# Patient Record
Sex: FEMALE | Race: WHITE | HISPANIC OR LATINO | URBAN - METROPOLITAN AREA
[De-identification: names, ages, dates, MRNs, and addresses within clinical notes are randomized per-mention and may not be internally consistent; named-entity substitution may affect disease eponyms.]

---

## 2017-02-18 ENCOUNTER — OUTPATIENT (OUTPATIENT)
Dept: OUTPATIENT SERVICES | Facility: HOSPITAL | Age: 57
LOS: 1 days | End: 2017-02-18
Payer: COMMERCIAL

## 2017-02-18 ENCOUNTER — APPOINTMENT (OUTPATIENT)
Dept: MRI IMAGING | Facility: CLINIC | Age: 57
End: 2017-02-18

## 2017-02-18 DIAGNOSIS — Z00.8 ENCOUNTER FOR OTHER GENERAL EXAMINATION: ICD-10-CM

## 2017-02-18 PROCEDURE — 73721 MRI JNT OF LWR EXTRE W/O DYE: CPT

## 2018-02-12 ENCOUNTER — APPOINTMENT (OUTPATIENT)
Dept: ORTHOPEDIC SURGERY | Facility: CLINIC | Age: 58
End: 2018-02-12

## 2018-03-12 ENCOUNTER — APPOINTMENT (OUTPATIENT)
Dept: ORTHOPEDIC SURGERY | Facility: CLINIC | Age: 58
End: 2018-03-12
Payer: COMMERCIAL

## 2018-03-12 VITALS
HEART RATE: 89 BPM | DIASTOLIC BLOOD PRESSURE: 75 MMHG | BODY MASS INDEX: 33.13 KG/M2 | SYSTOLIC BLOOD PRESSURE: 121 MMHG | WEIGHT: 180 LBS | HEIGHT: 62 IN

## 2018-03-12 PROCEDURE — 99203 OFFICE O/P NEW LOW 30 MIN: CPT | Mod: 25

## 2018-03-12 PROCEDURE — 20550 NJX 1 TENDON SHEATH/LIGAMENT: CPT | Mod: LT

## 2018-11-27 ENCOUNTER — APPOINTMENT (OUTPATIENT)
Dept: ORTHOPEDIC SURGERY | Facility: CLINIC | Age: 58
End: 2018-11-27

## 2018-12-11 ENCOUNTER — APPOINTMENT (OUTPATIENT)
Dept: ORTHOPEDIC SURGERY | Facility: CLINIC | Age: 58
End: 2018-12-11
Payer: COMMERCIAL

## 2018-12-11 DIAGNOSIS — Z80.9 FAMILY HISTORY OF MALIGNANT NEOPLASM, UNSPECIFIED: ICD-10-CM

## 2018-12-11 DIAGNOSIS — Z87.39 PERSONAL HISTORY OF OTHER DISEASES OF THE MUSCULOSKELETAL SYSTEM AND CONNECTIVE TISSUE: ICD-10-CM

## 2018-12-11 DIAGNOSIS — M65.332 TRIGGER FINGER, LEFT MIDDLE FINGER: ICD-10-CM

## 2018-12-11 DIAGNOSIS — Z78.9 OTHER SPECIFIED HEALTH STATUS: ICD-10-CM

## 2018-12-11 DIAGNOSIS — F19.90 OTHER PSYCHOACTIVE SUBSTANCE USE, UNSPECIFIED, UNCOMPLICATED: ICD-10-CM

## 2018-12-11 DIAGNOSIS — Z87.42 PERSONAL HISTORY OF OTHER DISEASES OF THE FEMALE GENITAL TRACT: ICD-10-CM

## 2018-12-11 PROCEDURE — 20550 NJX 1 TENDON SHEATH/LIGAMENT: CPT | Mod: LT

## 2018-12-11 PROCEDURE — 99214 OFFICE O/P EST MOD 30 MIN: CPT | Mod: 25

## 2019-06-16 ENCOUNTER — APPOINTMENT (OUTPATIENT)
Dept: ORTHOPEDIC SURGERY | Facility: CLINIC | Age: 59
End: 2019-06-16
Payer: COMMERCIAL

## 2019-06-16 VITALS — SYSTOLIC BLOOD PRESSURE: 122 MMHG | HEART RATE: 67 BPM | DIASTOLIC BLOOD PRESSURE: 64 MMHG

## 2019-06-16 VITALS — WEIGHT: 190 LBS | BODY MASS INDEX: 34.96 KG/M2 | HEIGHT: 62 IN

## 2019-06-16 DIAGNOSIS — G89.29 PAIN IN RIGHT KNEE: ICD-10-CM

## 2019-06-16 DIAGNOSIS — M25.561 PAIN IN RIGHT KNEE: ICD-10-CM

## 2019-06-16 PROCEDURE — 73564 X-RAY EXAM KNEE 4 OR MORE: CPT | Mod: RT

## 2019-06-16 PROCEDURE — 99213 OFFICE O/P EST LOW 20 MIN: CPT

## 2019-06-16 NOTE — PATIENT INSTRUCTIONS
[FreeTextEntry1] : Ice affected area for 20 minutes several times daily as needed, with at least 20 minutes of ice removal in between icing sessions \par Use OTC NSAIDs as instructed. Take with food. \par Physical Therapy\par

## 2019-06-16 NOTE — DISCUSSION/SUMMARY
[de-identified] : X-rays were reviewed with patient. Discussed the role of avoidance of irritating activities, ice, NSAIDs, visco supplementation, intra-articular steroid injections, physical therapy, home exercise program, possible need for eventual total knee replacement.

## 2019-06-16 NOTE — HISTORY OF PRESENT ILLNESS
[de-identified] : This patient presents with h/o right knee pain on and off for years. Most of the pain is located at medial aspect. Pain is described as sore and aching. There has not been any recent trauma. Patient admits to stiffness in the knee. Patient reports difficulty weight-bearing when getting up from a seated position.  There is pain when ambulating and stairs are difficult.  Patient denies swelling.  There is aching sometimes at night. There are no catching, locking or giving way episodes.  Patient has not had any treatment for this in the past, but has seen Dr. Ino Aviles for knee pain in the past.  She just didn't do anything for it. Patient has not tried ice/heat. Once in a while she will take an Advil, but typically does not take anything

## 2019-06-16 NOTE — PHYSICAL EXAM
[Slightly Antalgic] : slightly antalgic [Normal RLE] : Right Lower Extremity: No scars, rashes, lesions, ulcers, skin intact [Normal LLE] : Left Lower Extremity: No scars, rashes, lesions, ulcers, skin intact [Obese] : obese [de-identified] : no swelling, no edema, skin warm and well-perfused  [de-identified] : RightKnee:\par No varus/valgus deformity\par + circumferential enlargement\par no erythema, ecchymosis\par Trace effusion\par + Medial JLT       \par No Lateral JLT\par + significant pes anserine tenderness\par mild PF crepitus with ROM\par knee extension = -1,  knee flexion = 120\par 5/5 quads and adductors\par no laxity with varus/valgus stress\par Negative  Lachman & Anterior Drawer\par calf supple and NT\par \par Left knee exam limited as patient just had an injury at work that has not been evaluated\par + circumferential enlargement, no valgus or varus deformity\par 5/5 quads and adductors\par calf supple and NT [Normal] : no peripheral adenopathy appreciated [de-identified] : Normal rate, no increased respiratory effort, no use of accessory muscles, no nasal flaring  [de-identified] : central obesity [de-identified] : X-rays of the right knee reveal medial compartment narrowing, osteophyte formation at the femorotibial joint and patellofemoral joint.

## 2019-06-19 ENCOUNTER — APPOINTMENT (OUTPATIENT)
Dept: ORTHOPEDIC SURGERY | Facility: CLINIC | Age: 59
End: 2019-06-19
Payer: OTHER MISCELLANEOUS

## 2019-06-19 VITALS — BODY MASS INDEX: 34.96 KG/M2 | WEIGHT: 190 LBS | HEIGHT: 62 IN

## 2019-06-19 DIAGNOSIS — S80.02XA CONTUSION OF LEFT KNEE, INITIAL ENCOUNTER: ICD-10-CM

## 2019-06-19 PROCEDURE — 73564 X-RAY EXAM KNEE 4 OR MORE: CPT | Mod: LT

## 2019-06-19 PROCEDURE — 99214 OFFICE O/P EST MOD 30 MIN: CPT

## 2019-08-14 ENCOUNTER — APPOINTMENT (OUTPATIENT)
Dept: ORTHOPEDIC SURGERY | Facility: CLINIC | Age: 59
End: 2019-08-14
Payer: OTHER MISCELLANEOUS

## 2019-08-14 VITALS — HEIGHT: 62 IN | WEIGHT: 190 LBS | BODY MASS INDEX: 34.96 KG/M2

## 2019-08-14 PROCEDURE — 99214 OFFICE O/P EST MOD 30 MIN: CPT

## 2019-08-14 RX ORDER — HYALURONATE SODIUM, STABILIZED 88 MG/4 ML
88 SYRINGE (ML) INTRAARTICULAR
Qty: 1 | Refills: 0 | Status: ACTIVE | COMMUNITY
Start: 2019-08-14

## 2020-04-22 ENCOUNTER — APPOINTMENT (OUTPATIENT)
Dept: ORTHOPEDIC SURGERY | Facility: CLINIC | Age: 60
End: 2020-04-22
Payer: COMMERCIAL

## 2020-04-22 VITALS — TEMPERATURE: 97.5 F

## 2020-04-22 DIAGNOSIS — S93.409A SPRAIN OF UNSPECIFIED LIGAMENT OF UNSPECIFIED ANKLE, INITIAL ENCOUNTER: ICD-10-CM

## 2020-04-22 PROCEDURE — 99214 OFFICE O/P EST MOD 30 MIN: CPT

## 2020-04-22 PROCEDURE — 73610 X-RAY EXAM OF ANKLE: CPT | Mod: LT

## 2020-04-22 NOTE — DISCUSSION/SUMMARY
[de-identified] : I discussed the underlying pathophysiology of the patient's condition in great detail with the patient. I went over the patient's x-rays with them in great detail. We discussed the use of ice, Tylenol and anti-inflammatories to relieve pain. The patient was instructed in ROM exercises they are to do at home. At-home strengthening, and stretching exercises were discussed and demonstrated with the patient. We went over the wide ranging benefits of exercise for the patient health and I encouraged her to begin a diligent exercise program. I stressed the importance of continued weight loss and its benefits to the patient’s joints and overall health.  She needs to avoid high-impact activities such as running and jumping. I recommend alternative activities such as riding a stationary bike on low tension, or the elliptical. She should focus on light weight and high repetition exercises. She  should avoid squatting, and kneeling. Regarding her left ankle she should do ROM and strength exercises in a basin filled with cold water. Proper cane walking protocol were discussed and demonstrated with the patient. She should use a cane in the right hand to unload her left ankle. I recommended that she uses a Thera-band to practice left ankle ROM and strength exercises, and supplied her with a blue one. We discussed in detail that risk factors such as Diabetes, Hypertension, and Obesity are major risk factors for day COVID-19 and having serious consequences as a result. Because she has some of these risk factors I highly encouraged the patient to lose weight as fast and as healthy as possible. All of her questions were answered. She understands and consents to the plan. \par \par FU 1 month \par after completing a diligent HEP

## 2020-04-22 NOTE — ADDENDUM
[FreeTextEntry1] : I, Tavo Suarez , acted solely as a scribe for Dr. Ino Aviles on this date 04/22/2020.\par All medical record entries made by the Scribe were at my, Dr. Ino Aviles, direction and personally dictated by me on 04/22/2020. I have reviewed the chart and agree that the record accurately reflects my personal performance of the history, physical exam, assessment and plan. I have also personally directed, reviewed, and agreed with the chart.

## 2020-04-22 NOTE — HISTORY OF PRESENT ILLNESS
[de-identified] : 60 year old patient presents complaining of left ankle pain and swelling that started as a result of a fall on 04/08/2020. She was leaving someone's house and slipped on a pile of leaves. She landed on her backside and is unsure whether her ankle twisted or not. She notes that her ankle has been swollen, painful and stiff since this happened. She has not injured her ankle in the past. Walking and ROM have become difficult. She has been icing, which has helped minimally. She does note that the swelling has improved a bit but the pain and stiffness have not changed very much. She does not have pain that wakes her at night but when she gets up after resting, her pain and stiffness are worse. She denies numbness and tingling. She takes Otesla for psoriatic arthritis

## 2020-04-22 NOTE — PHYSICAL EXAM
[de-identified] : Constitutional\par o Appearance : well-nourished, well developed, alert, in no acute distress \par Head and Face\par o Head :\par ¦ Inspection : atraumatic, normocephalic\par o Face :\par ¦ Inspection : no visible rash or discoloration\par Lymphatic\par o Epitrochlear Nodes : no lymphadenopathy \par o Popliteal Nodes : no palpable nodes present \par o Supraclavicular Nodes : no supraclavicular nodes present \par o Preauricular Nodes : no preauricular nodes present \par o Additional Nodes : no additional adenopathy present Skin and Subcutaneous Tissue \par o Head and Neck :\par ¦ Face : no facial lesions\par Neurologic\par o Mental Status Examination :\par ¦ Orientation : alert and oriented X 3\par o Coordination : finger-to-nose-to-finger testing normal bilaterally, heel-shin cerebellar test within normal limits bilaterally \par Psychiatric\par o Mood and Affect: mood normal, affect appropriate \par \par Right Lower Extremity\par o Right Ankle :\par ¦ Inspection/Palpation : no tenderness to palpation, no swelling    \par ¦ Range of Motion : arc of motion full and painless in all planes\par ¦ Stability : no joint instability en provocative testing\par ¦ Strength : all muscles 5/5\par o Skin : no erythema or ecchymosis present\par o Sensation : sensation to pin intact\par Tests and Signs : De La Rosa's test negative\par \par o Right Foot:\par ¦ Inspection/Palpation : no tenderness to palpation, no swelling\par ¦ Range of Motion : arc of motion full and painless in all planes\par ¦ Stability : no joint instability on provocative testing\par ¦ Strength : all muscles 5/5\par o Skin : no erythema or ecchymosis present\par \par Left Lower Extremity \par o Left Ankle :\par ¦ Inspection/Palpation : ATFL tenderness to palpation, tenderness over the CFL, no deltoid ligament tenderness, no proximal fibular tenderness,  moderate swelling, resolving ecchymosis on the medial and lateral aspect of the ankle, Achillis intact. \par ¦ Range of Motion : pain with dorsi and volar flexion, pain with inversion. \par ¦ Stability : no joint instability en provocative testing\par ¦ Strength : all muscles 5/5\par o Skin : no erythema or ecchymosis present\par o Sensation : sensation to pin intact\par Tests and Signs : De La Rosa's test negative\par \par o Left Foot:\par ¦ Inspection/Palpation : no tenderness to palpation, moderate swelling\par ¦ Range of Motion : arc of motion full and painless in all planes\par ¦ Stability : no joint instability on provocative testing\par ¦ Strength : all muscles 5/5\par o Skin : no erythema or ecchymosis present\par \par Dorsalis Pedis / Posterior Tibialis Pulse Bilaterally: 2+\par \par Gait and Station:\par Gait: short cautious gait, pain with weight bearing,  no significant extremity swelling or lymphedema, good proprioception and balance\par \par  Radiology Results \par o Left Ankle : AP, lateral and mortise views were obtained and reveal mid foot arthritis, mild degenerative arthritis of the ankle, no obvious fracture.

## 2020-05-27 ENCOUNTER — APPOINTMENT (OUTPATIENT)
Dept: ORTHOPEDIC SURGERY | Facility: CLINIC | Age: 60
End: 2020-05-27
Payer: COMMERCIAL

## 2020-05-27 VITALS — BODY MASS INDEX: 34.96 KG/M2 | WEIGHT: 190 LBS | HEIGHT: 62 IN

## 2020-05-27 PROCEDURE — 99214 OFFICE O/P EST MOD 30 MIN: CPT | Mod: 25

## 2020-05-27 PROCEDURE — 20605 DRAIN/INJ JOINT/BURSA W/O US: CPT | Mod: LT

## 2020-06-29 ENCOUNTER — APPOINTMENT (OUTPATIENT)
Dept: ORTHOPEDIC SURGERY | Facility: CLINIC | Age: 60
End: 2020-06-29

## 2020-07-01 ENCOUNTER — APPOINTMENT (OUTPATIENT)
Dept: ORTHOPEDIC SURGERY | Facility: CLINIC | Age: 60
End: 2020-07-01
Payer: COMMERCIAL

## 2020-07-01 DIAGNOSIS — M19.079 PRIMARY OSTEOARTHRITIS, UNSPECIFIED ANKLE AND FOOT: ICD-10-CM

## 2020-07-01 PROCEDURE — 99214 OFFICE O/P EST MOD 30 MIN: CPT

## 2020-07-01 PROCEDURE — 73562 X-RAY EXAM OF KNEE 3: CPT | Mod: LT

## 2020-07-01 RX ORDER — HYALURONATE SODIUM, STABILIZED 88 MG/4 ML
88 SYRINGE (ML) INTRAARTICULAR
Qty: 1 | Refills: 0 | Status: ACTIVE | COMMUNITY
Start: 2020-07-01

## 2020-07-05 ENCOUNTER — FORM ENCOUNTER (OUTPATIENT)
Age: 60
End: 2020-07-05

## 2020-07-09 ENCOUNTER — APPOINTMENT (OUTPATIENT)
Dept: ORTHOPEDIC SURGERY | Facility: CLINIC | Age: 60
End: 2020-07-09
Payer: OTHER MISCELLANEOUS

## 2020-07-09 DIAGNOSIS — S80.02XD CONTUSION OF LEFT KNEE, SUBSEQUENT ENCOUNTER: ICD-10-CM

## 2020-07-09 PROCEDURE — 99214 OFFICE O/P EST MOD 30 MIN: CPT

## 2020-07-09 RX ORDER — HYALURONATE SODIUM, STABILIZED 88 MG/4 ML
88 SYRINGE (ML) INTRAARTICULAR
Qty: 1 | Refills: 0 | Status: ACTIVE | COMMUNITY
Start: 2020-07-09

## 2020-11-18 ENCOUNTER — APPOINTMENT (OUTPATIENT)
Dept: ORTHOPEDIC SURGERY | Facility: CLINIC | Age: 60
End: 2020-11-18
Payer: OTHER MISCELLANEOUS

## 2020-11-18 PROCEDURE — 99214 OFFICE O/P EST MOD 30 MIN: CPT | Mod: 25

## 2020-11-18 PROCEDURE — 20611 DRAIN/INJ JOINT/BURSA W/US: CPT | Mod: RT

## 2020-12-23 ENCOUNTER — APPOINTMENT (OUTPATIENT)
Dept: ORTHOPEDIC SURGERY | Facility: CLINIC | Age: 60
End: 2020-12-23
Payer: OTHER MISCELLANEOUS

## 2020-12-23 PROCEDURE — 99213 OFFICE O/P EST LOW 20 MIN: CPT

## 2020-12-23 PROCEDURE — 99072 ADDL SUPL MATRL&STAF TM PHE: CPT

## 2021-03-24 ENCOUNTER — APPOINTMENT (OUTPATIENT)
Dept: ORTHOPEDIC SURGERY | Facility: CLINIC | Age: 61
End: 2021-03-24

## 2021-04-14 ENCOUNTER — APPOINTMENT (OUTPATIENT)
Dept: ORTHOPEDIC SURGERY | Facility: CLINIC | Age: 61
End: 2021-04-14

## 2021-07-21 ENCOUNTER — APPOINTMENT (OUTPATIENT)
Dept: ORTHOPEDIC SURGERY | Facility: CLINIC | Age: 61
End: 2021-07-21
Payer: OTHER MISCELLANEOUS

## 2021-07-21 VITALS — HEIGHT: 62 IN | BODY MASS INDEX: 34.96 KG/M2 | WEIGHT: 190 LBS

## 2021-07-21 PROCEDURE — 73562 X-RAY EXAM OF KNEE 3: CPT | Mod: LT

## 2021-07-21 PROCEDURE — 99213 OFFICE O/P EST LOW 20 MIN: CPT

## 2021-07-21 PROCEDURE — 99072 ADDL SUPL MATRL&STAF TM PHE: CPT

## 2021-07-21 RX ORDER — HYALURONATE SODIUM, STABILIZED 88 MG/4 ML
88 SYRINGE (ML) INTRAARTICULAR
Qty: 1 | Refills: 0 | Status: ACTIVE | COMMUNITY
Start: 2021-07-21

## 2021-07-21 NOTE — HISTORY OF PRESENT ILLNESS
[de-identified] : Worker's Compensation Visit:\par \par 61 year old female presents complaining of left lateral knee pain. She received a left knee Monovisc injection on 11/18/2020 and did well with it. Her relief has been wearing off recently. She is complaining of discomfort at night time. She notes difficulty bending her knee and standing up from a seated position. She notes occasional buckling that has caused her to fall twice. She avoids motions involving bending and kneeling to avoid aggravating her pain. She has responded well to viscosupplementation in the past. The patient denies taking any medication to manage her pain. She understands that weight loss is important and is trying to lose weight. She is currently working. Pmhx: She has a hx of psoriatic arthritis and takes Otezla.\par \par Radiology Results taken on 7/1/2020:\par o Left Knee: Standing AP, lateral and tunnel views were obtained and revealed moderate to severe medial and patellofemoral arthritis.

## 2021-07-21 NOTE — DISCUSSION/SUMMARY
[de-identified] : I discussed the underlying pathophysiology of the patient's condition in great detail with the patient. I went over the patient's x-rays with them in great detail. We discussed the use of ice, Tylenol and anti-inflammatories to relieve pain. I stressed the importance of weight loss and its benefits to the patient’s joints and overall health. Viscosupplementation was discussed as a solution to the patient's symptoms, and we are requesting from Worker's Compensation authorization for Monovisc for the left knee. All of her questions were answered. She understands and consents to the plan.\par \par FU 1 month.\par after left knee viscosupplementation authorization.\par after focusing on weight loss.

## 2021-07-21 NOTE — REASON FOR VISIT
[Follow-Up Visit] : a follow-up visit for [Workers' Comp: Date of Injury: _______] : This visit is related to worker's compensation. Date of Injury: [unfilled] [Knee Pain] : knee pain

## 2021-07-21 NOTE — ADDENDUM
[FreeTextEntry1] : I, Yahir Gurrola, acted solely as a scribe for Dr. Ino Aviles on this date 07/21/2021.\par All medical record entries made by the Scribe were at my, Dr. Ino Aviles, direction and personally dictated by me on 07/21/2021. I have reviewed the chart and agree that the record accurately reflects my personal performance of the history, physical exam, assessment and plan. I have also personally directed, reviewed, and agreed with the chart.

## 2022-02-14 ENCOUNTER — APPOINTMENT (OUTPATIENT)
Dept: ORTHOPEDIC SURGERY | Facility: CLINIC | Age: 62
End: 2022-02-14
Payer: COMMERCIAL

## 2022-02-14 DIAGNOSIS — M65.341 TRIGGER FINGER, RIGHT RING FINGER: ICD-10-CM

## 2022-02-14 PROCEDURE — 20550 NJX 1 TENDON SHEATH/LIGAMENT: CPT | Mod: F8

## 2022-02-14 PROCEDURE — 99214 OFFICE O/P EST MOD 30 MIN: CPT | Mod: 25

## 2022-06-10 NOTE — PHYSICAL EXAM
[de-identified] : Constitutional\par o Appearance : well-nourished, well developed, alert, in no acute distress \par Head and Face\par o Head :\par ¦ Inspection : atraumatic, normocephalic\par o Face :\par ¦ Inspection : no visible rash or discoloration\par Lymphatic\par o Epitrochlear Nodes : no lymphadenopathy \par o Popliteal Nodes : no palpable nodes present \par o Supraclavicular Nodes : no supraclavicular nodes present \par o Preauricular Nodes : no preauricular nodes present \par o Additional Nodes : no additional adenopathy present Skin and Subcutaneous Tissue \par o Head and Neck :\par ¦ Face : no facial lesions\par Neurologic\par o Mental Status Examination :\par ¦ Orientation : alert and oriented X 3\par o Coordination : finger-to-nose-to-finger testing normal bilaterally, heel-shin cerebellar test within normal limits bilaterally \par Psychiatric\par o Mood and Affect: mood normal, affect appropriate \par \par Right Lower Extremity\par o Buttock : no tenderness, swelling or deformities \par o Right Hip :\par ¦ Inspection/Palpation : no tenderness, no swelling or deformities\par ¦ Range of Motion : full and painless in all planes, no crepitance\par ¦ Stability : joint stability intact\par ¦ Strength : extension, flexion, adduction, abduction, internal rotation and external rotation 4+/5\par \par o Right Knee :\par ¦ Inspection/Palpation : no tenderness to palpation, no swelling\par ¦ Range of Motion : active flexion and extension full and painless, no crepitance\par ¦ Stability : no valgus or varus instability present on provocative testing\par ¦ Strength : flexion and extension 4+/5\par ¦ Tests and Signs : Anterior Drawer negative, Lachman negative, Kathy's negative\par \par Left Lower Extremity \par o Buttock : no tenderness, swelling or deformities \par o Left Hip :\par ¦ Inspection/Palpation : no tenderness, no swelling or deformities\par ¦ Range of Motion : full and painless in all planes, no crepitance\par ¦ Stability : joint stability intact\par ¦ Strength : extension, flexion, adduction, abduction, internal rotation and external rotation 4+/5\par \par o Left Knee :\par ¦ Inspection/Palpation : lateral and mild medial compartment tenderness, mild swelling, valgus deformity\par ¦ Range of Motion : 0-130° with no discomfort on full flexion, patellofemoral crepitance, decreased patellofemoral glide\par ¦ Stability : no valgus or varus instability present on provocative testing\par ¦ Strength : flexion and extension 4+/5\par ¦ Tests and Signs : Anterior Drawer negative, Lachman negative, Kathy's negative\par \par Dorsalis Pedis / Posterior Tibialis Pulse Bilaterally: 2+\par \par Gait and Station:\par Gait: short cautious gait, no significant extremity swelling or lymphedema, good proprioception and balance\par \par Radiology Results \par o Left Knee : Standing AP, lateral and tunnel views of the knee were obtained and revealed moderate tricompartmental osteoarthritis worse in the medial and patellofemoral compartments. Valtrex Counseling: I discussed with the patient the risks of valacyclovir including but not limited to kidney damage, nausea, vomiting and severe allergy.  The patient understands that if the infection seems to be worsening or is not improving, they are to call.

## 2023-03-01 ENCOUNTER — APPOINTMENT (OUTPATIENT)
Dept: ORTHOPEDIC SURGERY | Facility: CLINIC | Age: 63
End: 2023-03-01
Payer: OTHER MISCELLANEOUS

## 2023-03-01 PROCEDURE — 99214 OFFICE O/P EST MOD 30 MIN: CPT | Mod: 25

## 2023-03-01 PROCEDURE — 73564 X-RAY EXAM KNEE 4 OR MORE: CPT | Mod: LT

## 2023-03-01 PROCEDURE — 20610 DRAIN/INJ JOINT/BURSA W/O US: CPT | Mod: LT

## 2023-03-01 PROCEDURE — 99072 ADDL SUPL MATRL&STAF TM PHE: CPT

## 2023-03-01 RX ORDER — HYALURONATE SODIUM, STABILIZED 88 MG/4 ML
88 SYRINGE (ML) INTRAARTICULAR
Qty: 1 | Refills: 0 | Status: ACTIVE | COMMUNITY
Start: 2023-03-01

## 2023-03-20 RX ORDER — HYALURONATE SODIUM, STABILIZED 88 MG/4 ML
88 SYRINGE (ML) INTRAARTICULAR
Qty: 1 | Refills: 0 | Status: ACTIVE | COMMUNITY
Start: 2023-03-20

## 2023-03-20 RX ORDER — HYALURONATE SODIUM, STABILIZED 88 MG/4 ML
88 SYRINGE (ML) INTRAARTICULAR
Qty: 1 | Refills: 0 | Status: ACTIVE | COMMUNITY
Start: 2023-03-20 | End: 1900-01-01

## 2023-04-10 ENCOUNTER — APPOINTMENT (OUTPATIENT)
Dept: ORTHOPEDIC SURGERY | Facility: CLINIC | Age: 63
End: 2023-04-10
Payer: OTHER MISCELLANEOUS

## 2023-04-10 DIAGNOSIS — M17.11 UNILATERAL PRIMARY OSTEOARTHRITIS, RIGHT KNEE: ICD-10-CM

## 2023-04-10 PROCEDURE — 20611 DRAIN/INJ JOINT/BURSA W/US: CPT | Mod: LT

## 2023-10-26 ENCOUNTER — APPOINTMENT (OUTPATIENT)
Dept: ORTHOPEDIC SURGERY | Facility: CLINIC | Age: 63
End: 2023-10-26
Payer: OTHER MISCELLANEOUS

## 2023-10-26 PROCEDURE — 99214 OFFICE O/P EST MOD 30 MIN: CPT

## 2023-10-26 RX ORDER — HYALURONATE SODIUM, STABILIZED 88 MG/4 ML
88 SYRINGE (ML) INTRAARTICULAR
Qty: 1 | Refills: 0 | Status: ACTIVE | COMMUNITY
Start: 2023-10-26

## 2023-11-27 RX ORDER — HYALURONATE SODIUM, STABILIZED 88 MG/4 ML
88 SYRINGE (ML) INTRAARTICULAR
Qty: 1 | Refills: 0 | Status: ACTIVE | COMMUNITY
Start: 2023-11-27 | End: 1900-01-01

## 2023-12-13 ENCOUNTER — APPOINTMENT (OUTPATIENT)
Dept: ORTHOPEDIC SURGERY | Facility: CLINIC | Age: 63
End: 2023-12-13
Payer: OTHER MISCELLANEOUS

## 2023-12-13 VITALS — WEIGHT: 195 LBS | BODY MASS INDEX: 35.88 KG/M2 | HEIGHT: 62 IN

## 2023-12-13 PROCEDURE — 20611 DRAIN/INJ JOINT/BURSA W/US: CPT | Mod: LT

## 2023-12-13 NOTE — PHYSICAL EXAM
[de-identified] : Constitutional o Appearance : well-nourished, well developed, alert, in no acute distress  Head and Face o Head :  Inspection : atraumatic, normocephalic o Face :  Inspection : no visible rash or discoloration Lymphatic o Epitrochlear Nodes : no lymphadenopathy  o Popliteal Nodes : no palpable nodes present  o Supraclavicular Nodes : no supraclavicular nodes present  o Preauricular Nodes : no preauricular nodes present  o Additional Nodes : no additional adenopathy present Skin and Subcutaneous Tissue  o Head and Neck :  Face : no facial lesions Neurologic o Mental Status Examination :  Orientation : alert and oriented X 3 o Coordination : finger-to-nose-to-finger testing normal bilaterally, heel-shin cerebellar test within normal limits bilaterally  Psychiatric o Mood and Affect: mood normal, affect appropriate   Right Lower Extremity o Buttock : no tenderness, swelling or deformities  o Right Hip :  Inspection/Palpation : no tenderness, no swelling or deformities  Range of Motion : full and painless in all planes, no crepitance  Stability : joint stability intact  Strength : extension, flexion, adduction, abduction, internal rotation and external rotation 4+/5  o Right Knee :  Inspection/Palpation : no tenderness to palpation, no swelling  Range of Motion : active flexion and extension full and painless, no crepitance  Stability : no valgus or varus instability present on provocative testing  Strength : flexion and extension 5/5  Tests and Signs : Anterior Drawer negative, Lachman negative, Kathy's negative  Left Lower Extremity  o Buttock : no tenderness, swelling or deformities  o Left Hip :  Inspection/Palpation : no tenderness, no swelling or deformities  Range of Motion : full and painless in all planes, no crepitance  Stability : joint stability intact  Strength : extension, flexion, adduction, abduction, internal rotation and external rotation 5/5  o Left Knee :  Inspection/Palpation : skin is intact, posterior medial joint line tenderness significant lateral compartment tenderness, no swelling, valgus deformity  Range of Motion : 0-118  with pain at the extremes of motion, patellofemoral crepitance, decreased patellofemoral glide  Stability : no valgus or varus instability present on provocative testing  Strength : flexion and extension 5/5  Tests and Signs : Anterior Drawer negative, Lachman negative, Kathy's negative  Dorsalis Pedis / Posterior Tibialis Pulse Bilaterally: 2+  Gait and Station: Gait: short cautious gait, no significant extremity swelling or lymphedema, good proprioception and balance  o Knee injection : Indication- left knee osteoarthritis, Anatomic location- left intra-articular joint space, Spray - area was sterilized with Betadine and alcohol and anesthetized with Ethyl Chloride , needle used-20G, Medications given- 4cc's Monovisc under Ultrasound guidance. The patient tolerated the procedure well. Lot# [id number] Exp#[id number]

## 2023-12-13 NOTE — ADDENDUM
[FreeTextEntry1] : I, NATAN FERNÁNDEZ, acted solely as a scribe for Dr. Ino Aviles on this date 12/13/2023.  All medical record entries made by the Scribe were at my, Dr. Ino Aviles, direction and personally dictated by me on 12/13/2023. I have reviewed the chart and agree that the record accurately reflects my personal performance of the history, physical exam, assessment and plan. I have also personally directed, reviewed, and agreed with the chart.

## 2023-12-13 NOTE — HISTORY OF PRESENT ILLNESS
[Yes] : The patient is currently working. [de-identified] : Worker's Compensation Visit:  63-year-old female presents for a left knee Monovisc injection today 12/13/2023. She denies any swelling or buckling. She had a left knee Monovisc injection on (04/10/2023) and reports improvement.  She received a cortisone injection on 03/01/23. She has left knee pain secondary to a WC injury. She has a history of psoriatic arthritis.  She has history of psoriasis and that is being treated by a Dermatologist.   Radiology Results from 03/01/2023: o Left Knee : Standing AP, lateral and tunnel views of the knee were obtained and revealed significant lateral and patellofemoral arthritis.

## 2023-12-13 NOTE — DISCUSSION/SUMMARY
[de-identified] : I went over the pathophysiology of the patient's symptoms in great detail with the patient. The patient elected to receive a Monovisc injection into her left knee today, and tolerated it well. I instructed the patient on ROM exercises, and told them to take it easy. The use of ice and rest was reviewed with the patient. The patient may resume activities tomorrow. I reminded the patient that it takes 4 to 6 weeks after the final injection to feel symptom relief.   All of their questions were answered. They understand and consent to the plan.   FU in 6 weeks.   The patient is an 63 year old female with bone on bone arthritis of their left knee. Based upon the patient's continued symptoms and failure to respond to conservative treatment I have recommended a total knee replacement for this patient. A long discussion took place with the patient describing what a total joint replacement is and what the procedure would entail. A total knee model, similar to the implant that will be used during the operation was utilized to demonstrate and to discuss the various bearing surfaces of the implants. The hospitalization and post-operative care and rehabilitation were also discussed. The use of perioperative antibiotics and DVT prophylaxis were discussed. The risk, benefits and alternatives to a surgical intervention were discussed at length with the patient. The patient was also advised of risks related to the medical comorbidities and elevated body mass index (BMI). A lengthy discussion took place to review the most common complications including but not limited to: deep vein thrombosis, pulmonary embolus, heart attack, stroke, infection, wound breakdown, numbness, damage to nerves, tendon, muscles, arteries or other blood vessels, death and other possible complications from anesthesia. The patient was told that we will take steps to minimize these risks by using sterile technique, antibiotics and DVT prophylaxis when appropriate and follow the patient postoperatively in the office setting to monitor progress. The possibility of recurrent pain, no improvement in pain and actual worsening of pain were also discussed with the patient.  The discharge plan of care focused on the patient going home following surgery. I encouraged the patient to make the necessary arrangements to have someone stay with them when they are discharged home. Following discharge, a home care nurse will visit the patient. They will open your home care case and request home physical therapy services. Home physical therapy will commence following discharge provided it is appropriate and covered by her health insurance benefit plan.  The risks, benefits and alternative treatment options of total joint replacement were reviewed with the patient at great length. All questions were answered to the satisfaction of the patient. The patient participated in an interactive discussion of the total knee replacement implant planned for their surgery with questions answered. The patient agreed with the treatment plan, and has decided to move forward with the elective total knee replacement as planned.  BLANK BOSS was seen face to face and needs a commode for bedside use as the patient has no ability to acces the bathroom in their home. The patient also requires a rolling walker as this is needed for activities of daily living within their home secondary to the diagnosis of osteoarthritis.

## 2024-02-12 ENCOUNTER — APPOINTMENT (OUTPATIENT)
Dept: ORTHOPEDIC SURGERY | Facility: CLINIC | Age: 64
End: 2024-02-12

## 2024-05-06 ENCOUNTER — APPOINTMENT (OUTPATIENT)
Dept: ORTHOPEDIC SURGERY | Facility: CLINIC | Age: 64
End: 2024-05-06
Payer: OTHER MISCELLANEOUS

## 2024-05-06 VITALS — BODY MASS INDEX: 35.88 KG/M2 | HEIGHT: 62 IN | WEIGHT: 195 LBS

## 2024-05-06 PROCEDURE — 73564 X-RAY EXAM KNEE 4 OR MORE: CPT | Mod: LT

## 2024-05-06 PROCEDURE — 99214 OFFICE O/P EST MOD 30 MIN: CPT

## 2024-05-06 RX ORDER — HYALURONATE SODIUM, STABILIZED 88 MG/4 ML
88 SYRINGE (ML) INTRAARTICULAR
Qty: 1 | Refills: 0 | Status: ACTIVE | COMMUNITY
Start: 2024-05-06

## 2024-05-06 NOTE — HISTORY OF PRESENT ILLNESS
[Yes] : The patient is currently working. [de-identified] : Worker's Compensation Visit:  63-year-old female presents for a left knee follow-up.  She had a left knee Monovisc injection on 12/13/2023 and reports significant improvement. She denies any swelling or buckling.  She received a cortisone injection on 03/01/23. She has left knee pain secondary to a WC injury. She has a history of psoriatic arthritis. She has history of psoriasis and that is being treated by a Dermatologist. She is working at this time. She denies any swelling or buckling. She has difficulty kneeling, squatting, and standing up from a seated position.   Radiology Results from 03/01/2023: o Left Knee : Standing AP, lateral and tunnel views of the knee were obtained and revealed significant lateral and patellofemoral arthritis.

## 2024-05-06 NOTE — ADDENDUM
[FreeTextEntry1] : I, NATAN FERNÁNDEZ, acted solely as a scribe for Dr. Ino Aviles on this date 05/06/2024.  All medical record entries made by the Scribe were at my, Dr. Ino Aviles, direction and personally dictated by me on 05/06/2024. I have reviewed the chart and agree that the record accurately reflects my personal performance of the history, physical exam, assessment and plan. I have also personally directed, reviewed, and agreed with the chart.

## 2024-05-06 NOTE — DISCUSSION/SUMMARY
[de-identified] : I went over the pathophysiology of the patient's symptoms in great detail with the patient. I discussed the underlying pathophysiology of the patient's condition in great detail with the patient. I went over the patient's x-rays with them in great detail. I informed the patient they are due for a repeat gel injection any time after 06/13/2024 for the left knee. Visco supplementation was discussed as a solution to the patient's symptoms, and we are requesting Monovisc for the left knee. The patient has done really well with Visco supplementation.   All of their questions were answered. They understand and consent to the plan.   WORK STATUS: She is working full duty without restriction. She is 0% disabled.   FU after authorization.   The patient is an 63 year old female with bone on bone arthritis of their left knee. Based upon the patient's continued symptoms and failure to respond to conservative treatment I have recommended a total knee replacement for this patient. A long discussion took place with the patient describing what a total joint replacement is and what the procedure would entail. A total knee model, similar to the implant that will be used during the operation was utilized to demonstrate and to discuss the various bearing surfaces of the implants. The hospitalization and post-operative care and rehabilitation were also discussed. The use of perioperative antibiotics and DVT prophylaxis were discussed. The risk, benefits and alternatives to a surgical intervention were discussed at length with the patient. The patient was also advised of risks related to the medical comorbidities and elevated body mass index (BMI). A lengthy discussion took place to review the most common complications including but not limited to: deep vein thrombosis, pulmonary embolus, heart attack, stroke, infection, wound breakdown, numbness, damage to nerves, tendon, muscles, arteries or other blood vessels, death and other possible complications from anesthesia. The patient was told that we will take steps to minimize these risks by using sterile technique, antibiotics and DVT prophylaxis when appropriate and follow the patient postoperatively in the office setting to monitor progress. The possibility of recurrent pain, no improvement in pain and actual worsening of pain were also discussed with the patient.  The discharge plan of care focused on the patient going home following surgery. I encouraged the patient to make the necessary arrangements to have someone stay with them when they are discharged home. Following discharge, a home care nurse will visit the patient. They will open your home care case and request home physical therapy services. Home physical therapy will commence following discharge provided it is appropriate and covered by her health insurance benefit plan.  The risks, benefits and alternative treatment options of total joint replacement were reviewed with the patient at great length. All questions were answered to the satisfaction of the patient. The patient participated in an interactive discussion of the total knee replacement implant planned for their surgery with questions answered. The patient agreed with the treatment plan, and has decided to move forward with the elective total knee replacement as planned.  BLANK GERARDO OBSS was seen face to face and needs a commode for bedside use as the patient has no ability to acces the bathroom in their home. The patient also requires a rolling walker as this is needed for activities of daily living within their home secondary to the diagnosis of osteoarthritis.

## 2024-05-06 NOTE — PHYSICAL EXAM
[de-identified] : Constitutional o Appearance : well-nourished, well developed, alert, in no acute distress  Head and Face o Head :  Inspection : atraumatic, normocephalic o Face :  Inspection : no visible rash or discoloration Lymphatic o Epitrochlear Nodes : no lymphadenopathy  o Popliteal Nodes : no palpable nodes present  o Supraclavicular Nodes : no supraclavicular nodes present  o Preauricular Nodes : no preauricular nodes present  o Additional Nodes : no additional adenopathy present Skin and Subcutaneous Tissue  o Head and Neck :  Face : no facial lesions Neurologic o Mental Status Examination :  Orientation : alert and oriented X 3 o Coordination : finger-to-nose-to-finger testing normal bilaterally, heel-shin cerebellar test within normal limits bilaterally  Psychiatric o Mood and Affect: mood normal, affect appropriate   Right Lower Extremity o Buttock : no tenderness, swelling or deformities  o Right Hip :  Inspection/Palpation : no tenderness, no swelling or deformities  Range of Motion : full and painless in all planes, no crepitance  Stability : joint stability intact  Strength : extension, flexion, adduction, abduction, internal rotation and external rotation 4+/5  o Right Knee :  Inspection/Palpation : no tenderness to palpation, no swelling  Range of Motion : active flexion and extension full and painless, no crepitance  Stability : no valgus or varus instability present on provocative testing  Strength : flexion and extension 5/5  Tests and Signs : Anterior Drawer negative, Lachman negative, Kathy's negative  Left Lower Extremity  o Buttock : no tenderness, swelling or deformities  o Left Hip :  Inspection/Palpation : no tenderness, no swelling or deformities  Range of Motion : full and painless in all planes, no crepitance  Stability : joint stability intact  Strength : extension, flexion, adduction, abduction, internal rotation and external rotation 5/5  o Left Knee :  Inspection/Palpation : skin is intact, significant lateral compartment tenderness, no swelling, valgus deformity  Range of Motion : 3-130 with pain at the extremes of motion, patellofemoral crepitance, decreased patellofemoral glide  Stability : no valgus or varus instability present on provocative testing  Strength : flexion and extension 5/5  Tests and Signs : Anterior Drawer negative, Lachman negative, Kathy's negative  Dorsalis Pedis / Posterior Tibialis Pulse Bilaterally: 2+  Gait and Station: Gait: short cautious gait, no significant extremity swelling or lymphedema, good proprioception and balance  Radiology Results 5/6/2024 o Left Knee : Standing AP, lateral and tunnel views of the knee were obtained and revealed significant lateral and patellofemoral arthritis

## 2024-05-30 ENCOUNTER — APPOINTMENT (OUTPATIENT)
Dept: CARDIOLOGY | Facility: CLINIC | Age: 64
End: 2024-05-30
Payer: COMMERCIAL

## 2024-05-30 ENCOUNTER — NON-APPOINTMENT (OUTPATIENT)
Age: 64
End: 2024-05-30

## 2024-05-30 VITALS
OXYGEN SATURATION: 95 % | HEIGHT: 62 IN | HEART RATE: 81 BPM | WEIGHT: 195 LBS | SYSTOLIC BLOOD PRESSURE: 129 MMHG | BODY MASS INDEX: 35.88 KG/M2 | DIASTOLIC BLOOD PRESSURE: 78 MMHG

## 2024-05-30 DIAGNOSIS — E66.9 OBESITY, UNSPECIFIED: ICD-10-CM

## 2024-05-30 DIAGNOSIS — R06.09 OTHER FORMS OF DYSPNEA: ICD-10-CM

## 2024-05-30 DIAGNOSIS — M17.12 UNILATERAL PRIMARY OSTEOARTHRITIS, LEFT KNEE: ICD-10-CM

## 2024-05-30 DIAGNOSIS — Z00.00 ENCOUNTER FOR GENERAL ADULT MEDICAL EXAMINATION W/OUT ABNORMAL FINDINGS: ICD-10-CM

## 2024-05-30 PROCEDURE — 99204 OFFICE O/P NEW MOD 45 MIN: CPT

## 2024-05-30 NOTE — ASSESSMENT
[FreeTextEntry1] : Assessment: 1.  Dyspnea on exertion 2.  strong family history of VTE 3.  Obesity   Plan: 1.  Echo 2.  Coronary CTA 3.  Venous duplex 4.  weight loss 5.  Labs today.   6.  not clear for colonoscopy yet.   7.  Referral to [facility] for weight loss.

## 2024-05-30 NOTE — PHYSICAL EXAM
[General Appearance - Well Developed] : well developed [Normal Appearance] : normal appearance [Well Groomed] : well groomed [General Appearance - Well Nourished] : well nourished [No Deformities] : no deformities [General Appearance - In No Acute Distress] : no acute distress [Normal Conjunctiva] : the conjunctiva exhibited no abnormalities [Eyelids - No Xanthelasma] : the eyelids demonstrated no xanthelasmas [Normal Oral Mucosa] : normal oral mucosa [No Oral Pallor] : no oral pallor [No Oral Cyanosis] : no oral cyanosis [Normal Jugular Venous A Waves Present] : normal jugular venous A waves present [Normal Jugular Venous V Waves Present] : normal jugular venous V waves present [No Jugular Venous Jose A Waves] : no jugular venous jose A waves [Heart Rate And Rhythm] : heart rate and rhythm were normal [Heart Sounds] : normal S1 and S2 [Murmurs] : no murmurs present [Respiration, Rhythm And Depth] : normal respiratory rhythm and effort [Exaggerated Use Of Accessory Muscles For Inspiration] : no accessory muscle use [Auscultation Breath Sounds / Voice Sounds] : lungs were clear to auscultation bilaterally [Abdomen Soft] : soft [Abdomen Tenderness] : non-tender [] : no hepato-splenomegaly [Abdomen Mass (___ Cm)] : no abdominal mass palpated [FreeTextEntry1] : LE edema.

## 2024-05-30 NOTE — HISTORY OF PRESENT ILLNESS
[FreeTextEntry1] : 5/30  63 y/o F with PMHX OA, who presents for evaluation.  She has been feeling progressive shortness of breath with exertion.  She does have ankle edema bilaterally.   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  30 plus pounds  She has psoriatic arthritis and has L knee pains  Medications: Omeprazole 20mg daily  Calcium  No labs in a while.   BP is excellent Brother and father with history of PE  She follows with Dr. Neil vascular doc- for varicose veins.  Last duplex was 1 year ago.

## 2024-05-31 LAB
ALBUMIN SERPL ELPH-MCNC: 4.6 G/DL
ALP BLD-CCNC: 106 U/L
ALT SERPL-CCNC: 24 U/L
ANION GAP SERPL CALC-SCNC: 11 MMOL/L
AST SERPL-CCNC: 24 U/L
BILIRUB SERPL-MCNC: 0.3 MG/DL
BUN SERPL-MCNC: 19 MG/DL
CALCIUM SERPL-MCNC: 9.5 MG/DL
CHLORIDE SERPL-SCNC: 106 MMOL/L
CO2 SERPL-SCNC: 27 MMOL/L
CREAT SERPL-MCNC: 0.78 MG/DL
EGFR: 84 ML/MIN/1.73M2
GLUCOSE SERPL-MCNC: 112 MG/DL
HCT VFR BLD CALC: 40.2 %
HGB BLD-MCNC: 13.3 G/DL
MCHC RBC-ENTMCNC: 30 PG
MCHC RBC-ENTMCNC: 33.1 GM/DL
MCV RBC AUTO: 90.5 FL
NT-PROBNP SERPL-MCNC: 42 PG/ML
PLATELET # BLD AUTO: 310 K/UL
POTASSIUM SERPL-SCNC: 4.5 MMOL/L
PROT SERPL-MCNC: 7.5 G/DL
RBC # BLD: 4.44 M/UL
RBC # FLD: 13.9 %
SODIUM SERPL-SCNC: 144 MMOL/L
TSH SERPL-ACNC: 1.91 UIU/ML
WBC # FLD AUTO: 6.51 K/UL

## 2024-06-04 DIAGNOSIS — E55.9 VITAMIN D DEFICIENCY, UNSPECIFIED: ICD-10-CM

## 2024-06-04 LAB
25(OH)D3 SERPL-MCNC: 16.9 NG/ML
CHOLEST SERPL-MCNC: 238 MG/DL
ESTIMATED AVERAGE GLUCOSE: 126 MG/DL
HBA1C MFR BLD HPLC: 6 %
HDLC SERPL-MCNC: 73 MG/DL
LDLC SERPL CALC-MCNC: 139 MG/DL
NONHDLC SERPL-MCNC: 165 MG/DL
TRIGL SERPL-MCNC: 147 MG/DL

## 2024-06-04 RX ORDER — ERGOCALCIFEROL 1.25 MG/1
1.25 MG CAPSULE, LIQUID FILLED ORAL
Qty: 12 | Refills: 0 | Status: ACTIVE | COMMUNITY
Start: 2024-06-04 | End: 1900-01-01

## 2024-06-10 ENCOUNTER — APPOINTMENT (OUTPATIENT)
Dept: ORTHOPEDIC SURGERY | Facility: CLINIC | Age: 64
End: 2024-06-10

## 2024-07-01 ENCOUNTER — APPOINTMENT (OUTPATIENT)
Dept: CARDIOLOGY | Facility: CLINIC | Age: 64
End: 2024-07-01
Payer: COMMERCIAL

## 2024-07-01 PROCEDURE — 93306 TTE W/DOPPLER COMPLETE: CPT

## 2024-07-10 ENCOUNTER — APPOINTMENT (OUTPATIENT)
Dept: CT IMAGING | Facility: CLINIC | Age: 64
End: 2024-07-10
Payer: COMMERCIAL

## 2024-07-10 PROCEDURE — 75574 CT ANGIO HRT W/3D IMAGE: CPT

## 2024-07-11 ENCOUNTER — APPOINTMENT (OUTPATIENT)
Dept: ORTHOPEDIC SURGERY | Facility: CLINIC | Age: 64
End: 2024-07-11
Payer: OTHER MISCELLANEOUS

## 2024-07-11 DIAGNOSIS — M17.12 UNILATERAL PRIMARY OSTEOARTHRITIS, LEFT KNEE: ICD-10-CM

## 2024-07-11 PROCEDURE — 20611 DRAIN/INJ JOINT/BURSA W/US: CPT | Mod: LT

## 2024-07-15 ENCOUNTER — NON-APPOINTMENT (OUTPATIENT)
Age: 64
End: 2024-07-15

## 2024-07-19 ENCOUNTER — NON-APPOINTMENT (OUTPATIENT)
Age: 64
End: 2024-07-19

## 2024-07-25 ENCOUNTER — APPOINTMENT (OUTPATIENT)
Dept: ORTHOPEDIC SURGERY | Facility: CLINIC | Age: 64
End: 2024-07-25
Payer: COMMERCIAL

## 2024-07-25 VITALS — BODY MASS INDEX: 35.88 KG/M2 | WEIGHT: 195 LBS | HEIGHT: 62 IN

## 2024-07-25 DIAGNOSIS — M17.11 UNILATERAL PRIMARY OSTEOARTHRITIS, RIGHT KNEE: ICD-10-CM

## 2024-07-25 PROCEDURE — 20610 DRAIN/INJ JOINT/BURSA W/O US: CPT | Mod: RT

## 2024-07-25 PROCEDURE — 73564 X-RAY EXAM KNEE 4 OR MORE: CPT | Mod: RT

## 2024-07-25 PROCEDURE — 99214 OFFICE O/P EST MOD 30 MIN: CPT | Mod: 25

## 2024-07-25 RX ORDER — HYALURONATE SODIUM, STABILIZED 88 MG/4 ML
88 SYRINGE (ML) INTRAARTICULAR
Qty: 1 | Refills: 0 | Status: ACTIVE | COMMUNITY
Start: 2024-07-25

## 2024-07-25 NOTE — ADDENDUM
[FreeTextEntry1] : I, NATAN FERNÁNDEZ, acted solely as a scribe for Dr. Ino Aviles on this date 07/25/2024.  All medical record entries made by the Scribe were at my, Dr. Ino Aviles, direction and personally dictated by me on 07/25/2024. I have reviewed the chart and agree that the record accurately reflects my personal performance of the history, physical exam, assessment and plan. I have also personally directed, reviewed, and agreed with the chart.

## 2024-07-25 NOTE — HISTORY OF PRESENT ILLNESS
[de-identified] : 64 year old female presents complaining of right knee pain that started last Friday. The patient cannot attribute her pain to any injury, fall, or trauma. She notes she cannot put weight on her right knee. She notes she is having difficulty walking. She is not a diabetic. She notes she cannot find comfort laying down.  She notes tightness and stiffness about her knee and localizes the knee pain to the medial compartment.There is no recent trauma.

## 2024-07-25 NOTE — DISCUSSION/SUMMARY
[de-identified] : I went over the pathophysiology of the patient's symptoms in great detail with the patient. I discussed the underlying pathophysiology of the patient's condition in great detail with the patient. I went over the patient's x-rays with them in great detail. The patient elected to receive a cortisone injection into her right knee today, and tolerated it well. I instructed the patient on ROM exercises, and told them to take it easy. The use of ice and rest was reviewed with the patient. The patient may resume activities tomorrow. Viscosupplementation was discussed as a solution to the patient's symptoms, and we are requesting Monovisc for the right knee.   All of their questions were answered. They understand and consent to the plan.   FU after authorization.

## 2024-07-25 NOTE — PHYSICAL EXAM
[de-identified] : Constitutional o Appearance : well-nourished, well developed, alert, in no acute distress  Head and Face o Head :  Inspection : atraumatic, normocephalic o Face :  Inspection : no visible rash or discoloration Respiratory o Respiratory Effort: breathing unlabored  Neurologic o Mental Status Examination :  Orientation : alert and oriented X 3 Psychiatric o Mood and Affect: mood normal, affect appropriate Cardiovascular o Observation/Palpation : - no swelling Lymphatic o Additional Nodes : No palpable lymph nodes present  Right Lower Extremity o Buttock : no tenderness, swelling or deformities  o Right Hip :  Inspection/Palpation : no tenderness, swelling or deformities  Range of Motion : full and painless in all planes, no crepitance  Stability : joint stability intact  Strength : extension, flexion, adduction, abduction, internal rotation and external rotation 5/5   o Right Knee :  Inspection/Palpation : severe medial compartment tenderness and lateral compartment tenderness to palpation, no swelling  Range of Motion : 0-120 active flexion and extension full and painless, no crepitance  Stability : no valgus or varus instability present on provocative testing  Strength : flexion and extension 5/5  Tests and Signs : negative Anterior Drawer, negative Lachman, negative Kathy  Left Lower Extremity o Buttock : no tenderness, swelling or deformities  o Left Hip :  Inspection/Palpation : no tenderness, no swelling or deformities  Range of Motion : full and painless in all planes, no crepitance  Stability : joint stability intact  Strength : extension, flexion, adduction, abduction, internal rotation and external rotation 5/5  o Left Knee :  Inspection/Palpation : no tenderness to palpation, no swelling  Range of Motion : active flexion and extension full and painless, no crepitance  Stability : no valgus or varus instability present on provocative testing  Strength : flexion and extension 5/5  Tests and Signs : negative Anterior Drawer, negative Lachman, negative Kathy  Gait and Station: Gait: walks stiff legged and antalgic gait, no significant extremity swelling or lymphedema, good proprioception and balance  Radiology Results 7/25/2024 o Right Knee : Standing AP, lateral, tunnel, and skyline views of the knee were obtained and reveal severe medial and patellofemoral arthritis   o Knee injection : Indication- right knee osteoarthritis, Anatomic location- right intra-articular joint space, Spray - area was sterilized with Betadine and alcohol and anesthetized with Ethyl Chloride , needle used-20G, Medications given- 5cc's lidocaine, 0.5cc's kenalog, 0.5 cc's dexamethasone. The patient tolerated the procedure well.

## 2024-08-27 ENCOUNTER — APPOINTMENT (OUTPATIENT)
Dept: ORTHOPEDIC SURGERY | Facility: CLINIC | Age: 64
End: 2024-08-27
Payer: OTHER MISCELLANEOUS

## 2024-08-27 VITALS — HEIGHT: 62 IN | WEIGHT: 195 LBS | BODY MASS INDEX: 35.88 KG/M2

## 2024-08-27 DIAGNOSIS — E66.9 OBESITY, UNSPECIFIED: ICD-10-CM

## 2024-08-27 PROCEDURE — 99214 OFFICE O/P EST MOD 30 MIN: CPT

## 2024-08-27 NOTE — DISCUSSION/SUMMARY
[de-identified] : I went over the pathophysiology of the patient's symptoms in great detail with the patient. I discussed the underlying pathophysiology of the patient's condition in great detail with the patient. I went over the patient's x-rays with them in great detail. Viscosupplementation was discussed as a solution to the patient's symptoms. We discussed the use of ice, Tylenol and anti-inflammatories to relieve pain. I informed the patient they are due for a repeat gel injection any time after 1/11/2025. I stressed the importance of weight loss and its benefits to the patients joints and overall health.   WORK STATUS: She is working full duty without restriction. She is 0% disabled.   FU in 6 weeks   The patient is an 63 year old female with bone on bone arthritis of their left knee. Based upon the patient's continued symptoms and failure to respond to conservative treatment I have recommended a total knee replacement for this patient. A long discussion took place with the patient describing what a total joint replacement is and what the procedure would entail. A total knee model, similar to the implant that will be used during the operation was utilized to demonstrate and to discuss the various bearing surfaces of the implants. The hospitalization and post-operative care and rehabilitation were also discussed. The use of perioperative antibiotics and DVT prophylaxis were discussed. The risk, benefits and alternatives to a surgical intervention were discussed at length with the patient. The patient was also advised of risks related to the medical comorbidities and elevated body mass index (BMI). A lengthy discussion took place to review the most common complications including but not limited to: deep vein thrombosis, pulmonary embolus, heart attack, stroke, infection, wound breakdown, numbness, damage to nerves, tendon, muscles, arteries or other blood vessels, death and other possible complications from anesthesia. The patient was told that we will take steps to minimize these risks by using sterile technique, antibiotics and DVT prophylaxis when appropriate and follow the patient postoperatively in the office setting to monitor progress. The possibility of recurrent pain, no improvement in pain and actual worsening of pain were also discussed with the patient.  The discharge plan of care focused on the patient going home following surgery. I encouraged the patient to make the necessary arrangements to have someone stay with them when they are discharged home. Following discharge, a home care nurse will visit the patient. They will open your home care case and request home physical therapy services. Home physical therapy will commence following discharge provided it is appropriate and covered by her health insurance benefit plan.  The risks, benefits and alternative treatment options of total joint replacement were reviewed with the patient at great length. All questions were answered to the satisfaction of the patient. The patient participated in an interactive discussion of the total knee replacement implant planned for their surgery with questions answered. The patient agreed with the treatment plan, and has decided to move forward with the elective total knee replacement as planned.  BLANK BOSS was seen face to face and needs a commode for bedside use as the patient has no ability to acces the bathroom in their home. The patient also requires a rolling walker as this is needed for activities of daily living within their home secondary to the diagnosis of osteoarthritis.

## 2024-08-27 NOTE — HISTORY OF PRESENT ILLNESS
[Yes] : The patient is currently working. [de-identified] : Worker's Compensation Visit:  63-year-old female presents for a left knee follow-up. She had a left knee Monovisc injection on 7/11/2024 and notes she is doing really well from the injection. She denies any swelling or buckling.  She received a cortisone injection on 03/01/23. She has left knee pain secondary to a WC injury. She has a history of psoriatic arthritis. She has history of psoriasis and that is being treated by a Dermatologist. She is working at this time. She denies any swelling or buckling. She has difficulty kneeling, squatting, and standing up from a seated position.   Radiology Results 5/6/2024 o Left Knee : Standing AP, lateral and tunnel views of the knee were obtained and revealed significant lateral and patellofemoral arthritis   Radiology Results from 03/01/2023: o Left Knee : Standing AP, lateral and tunnel views of the knee were obtained and revealed significant lateral and patellofemoral arthritis.

## 2024-08-27 NOTE — ADDENDUM
[FreeTextEntry1] : I, NATAN FERNÁNDEZ, acted solely as a scribe for Dr. Ino Aviles on this date 08/27/2024.  All medical record entries made by the Scribe were at my, Dr. Ino Aviles, direction and personally dictated by me on 08/27/2024. I have reviewed the chart and agree that the record accurately reflects my personal performance of the history, physical exam, assessment and plan. I have also personally directed, reviewed, and agreed with the chart.

## 2024-08-27 NOTE — PHYSICAL EXAM
[de-identified] : Constitutional o Appearance : well-nourished, well developed, alert, in no acute distress  Head and Face o Head :  Inspection : atraumatic, normocephalic o Face :  Inspection : no visible rash or discoloration Lymphatic o Epitrochlear Nodes : no lymphadenopathy  o Popliteal Nodes : no palpable nodes present  o Supraclavicular Nodes : no supraclavicular nodes present  o Preauricular Nodes : no preauricular nodes present  o Additional Nodes : no additional adenopathy present Skin and Subcutaneous Tissue  o Head and Neck :  Face : no facial lesions Neurologic o Mental Status Examination :  Orientation : alert and oriented X 3 o Coordination : finger-to-nose-to-finger testing normal bilaterally, heel-shin cerebellar test within normal limits bilaterally  Psychiatric o Mood and Affect: mood normal, affect appropriate   Right Lower Extremity o Buttock : no tenderness, swelling or deformities  o Right Hip :  Inspection/Palpation : no tenderness, no swelling or deformities  Range of Motion : full and painless in all planes, no crepitance  Stability : joint stability intact  Strength : extension, flexion, adduction, abduction, internal rotation and external rotation 4+/5  o Right Knee :  Inspection/Palpation : no tenderness to palpation, no swelling  Range of Motion : active flexion and extension full and painless, no crepitance  Stability : no valgus or varus instability present on provocative testing  Strength : flexion and extension 5/5  Tests and Signs : Anterior Drawer negative, Lachman negative, Kathy's negative  Left Lower Extremity  o Buttock : no tenderness, swelling or deformities  o Left Hip :  Inspection/Palpation : no tenderness, no swelling or deformities  Range of Motion : full and painless in all planes, no crepitance  Stability : joint stability intact  Strength : extension, flexion, adduction, abduction, internal rotation and external rotation 5/5  o Left Knee :  Inspection/Palpation : skin is intact, mild lateral compartment tenderness, no swelling, valgus deformity  Range of Motion : 3-130 with pain at the extremes of motion, patellofemoral crepitance, decreased patellofemoral glide  Stability : no valgus or varus instability present on provocative testing  Strength : flexion and extension 5/5  Tests and Signs : Anterior Drawer negative, Lachman negative, Kathy's negative  Dorsalis Pedis / Posterior Tibialis Pulse Bilaterally: 2+  Gait and Station: Gait: short cautious gait, no significant extremity swelling or lymphedema, good proprioception and balance

## 2024-08-27 NOTE — REASON FOR VISIT
[Follow-Up Visit] : a follow-up visit for [Workers' Comp: Date of Injury: _______] : This visit is related to worker's compensation. Date of Injury: [unfilled] [Knee Pain] : knee pain [FreeTextEntry2] : right knee

## 2024-09-03 ENCOUNTER — APPOINTMENT (OUTPATIENT)
Dept: ORTHOPEDIC SURGERY | Facility: CLINIC | Age: 64
End: 2024-09-03
Payer: COMMERCIAL

## 2024-09-03 VITALS — HEIGHT: 62 IN | WEIGHT: 195 LBS | BODY MASS INDEX: 35.88 KG/M2

## 2024-09-03 DIAGNOSIS — M17.11 UNILATERAL PRIMARY OSTEOARTHRITIS, RIGHT KNEE: ICD-10-CM

## 2024-09-03 DIAGNOSIS — M17.12 UNILATERAL PRIMARY OSTEOARTHRITIS, LEFT KNEE: ICD-10-CM

## 2024-09-03 PROCEDURE — 20611 DRAIN/INJ JOINT/BURSA W/US: CPT | Mod: RT

## 2024-09-03 NOTE — ADDENDUM
[FreeTextEntry1] : I, NATAN FERNÁNDEZ, acted solely as a scribe for Dr. Ino Aviles on this date 09/03/2024.  All medical record entries made by the Scribe were at my, Dr. Ino Aviles, direction and personally dictated by me on 09/03/2024. I have reviewed the chart and agree that the record accurately reflects my personal performance of the history, physical exam, assessment and plan. I have also personally directed, reviewed, and agreed with the chart.

## 2024-09-03 NOTE — PHYSICAL EXAM
[de-identified] : Constitutional o Appearance : well-nourished, well developed, alert, in no acute distress  Head and Face o Head :  Inspection : atraumatic, normocephalic o Face :  Inspection : no visible rash or discoloration Respiratory o Respiratory Effort: breathing unlabored  Neurologic o Mental Status Examination :  Orientation : alert and oriented X 3 Psychiatric o Mood and Affect: mood normal, affect appropriate Cardiovascular o Observation/Palpation : - no swelling Lymphatic o Additional Nodes : No palpable lymph nodes present  Right Lower Extremity o Buttock : no tenderness, swelling or deformities  o Right Hip :  Inspection/Palpation : no tenderness, swelling or deformities  Range of Motion : full and painless in all planes, no crepitance  Stability : joint stability intact  Strength : extension, flexion, adduction, abduction, internal rotation and external rotation 5/5   o Right Knee :  Inspection/Palpation : severe medial compartment tenderness and lateral compartment tenderness to palpation, no swelling  Range of Motion : 0-120 active flexion and extension full and painless, no crepitance  Stability : no valgus or varus instability present on provocative testing  Strength : flexion and extension 5/5  Tests and Signs : negative Anterior Drawer, negative Lachman, negative Kathy  Left Lower Extremity o Buttock : no tenderness, swelling or deformities  o Left Hip :  Inspection/Palpation : no tenderness, no swelling or deformities  Range of Motion : full and painless in all planes, no crepitance  Stability : joint stability intact  Strength : extension, flexion, adduction, abduction, internal rotation and external rotation 5/5  o Left Knee :  Inspection/Palpation : no tenderness to palpation, no swelling  Range of Motion : active flexion and extension full and painless, no crepitance  Stability : no valgus or varus instability present on provocative testing  Strength : flexion and extension 5/5  Tests and Signs : negative Anterior Drawer, negative Lachman, negative Kathy  Gait and Station: Gait: walks stiff legged and antalgic gait, no significant extremity swelling or lymphedema, good proprioception and balance  o Knee injection : Indication- right knee osteoarthritis, Anatomic location- right intra-articular joint space, Spray - area was sterilized with Betadine and alcohol and anesthetized with Ethyl Chloride , needle used-20G, Medications given- 4cc's Monovisc under Ultrasound guidance. The patient tolerated the procedure well.

## 2024-09-03 NOTE — HISTORY OF PRESENT ILLNESS
[de-identified] : 64 year old female presents for a right knee Monovisc injection today 9/3/2024. She denies any swelling or buckling. She had a right knee cortisone injection on 7/25/2024 and notes it helped. She has difficulty kneeling, squatting, and standing up from a seated position.   Radiology Results 7/25/2024 o Right Knee : Standing AP, lateral, tunnel, and skyline views of the knee were obtained and reveal severe medial and patellofemoral arthritis

## 2024-09-03 NOTE — DISCUSSION/SUMMARY
[de-identified] : I went over the pathophysiology of the patient's symptoms in great detail with the patient. The patient elected to receive a Monovisc injection into her right knee today, and tolerated it well. I instructed the patient on ROM exercises, and told them to take it easy. The use of ice and rest was reviewed with the patient. The patient may resume activities tomorrow. I reminded the patient that it takes 4 to 6 weeks after the final injection to feel symptom relief. We discussed the use of ice, Tylenol and anti-inflammatories to relieve pain.   All of their questions were answered. They understand and consent to the plan.   FU in 6 weeks.

## 2024-09-09 ENCOUNTER — APPOINTMENT (OUTPATIENT)
Dept: ORTHOPEDIC SURGERY | Facility: CLINIC | Age: 64
End: 2024-09-09

## 2024-09-26 ENCOUNTER — APPOINTMENT (OUTPATIENT)
Dept: ORTHOPEDIC SURGERY | Facility: CLINIC | Age: 64
End: 2024-09-26
Payer: COMMERCIAL

## 2024-09-26 VITALS — HEIGHT: 62 IN | BODY MASS INDEX: 35.88 KG/M2 | WEIGHT: 195 LBS

## 2024-09-26 VITALS — BODY MASS INDEX: 35.88 KG/M2 | HEIGHT: 62 IN | WEIGHT: 195 LBS

## 2024-09-26 DIAGNOSIS — M25.812 OTHER SPECIFIED JOINT DISORDERS, LEFT SHOULDER: ICD-10-CM

## 2024-09-26 DIAGNOSIS — M19.019 PRIMARY OSTEOARTHRITIS, UNSPECIFIED SHOULDER: ICD-10-CM

## 2024-09-26 PROCEDURE — 73030 X-RAY EXAM OF SHOULDER: CPT | Mod: LT

## 2024-09-26 PROCEDURE — 20610 DRAIN/INJ JOINT/BURSA W/O US: CPT | Mod: LT

## 2024-09-26 PROCEDURE — 99214 OFFICE O/P EST MOD 30 MIN: CPT | Mod: 25

## 2024-09-26 NOTE — PHYSICAL EXAM
[de-identified] : Constitutional o Appearance : well-nourished, well developed, alert, in no acute distress  Head and Face o Head :  Inspection : atraumatic, normocephalic o Face :  Inspection : no visible rash or discoloration Respiratory o Respiratory Effort: breathing unlabored  Neurologic o Sensation : Normal sensation  Psychiatric o Mood and Affect: mood normal, affect appropriate  Lymphatic o Additional Nodes : No palpable lymph nodes present   Cervical Spine o Inspection/Palpation :  Inspection : alignment midline, normal degree of lordosis present  Skin : normal appearance, no masses or tenderness, trachea midline  Palpation : musculature is nontender to palpation o Range of Motion : full ROM of the cervcial spine  o Tests: Negative Spurlings test  Right Upper Extremity o Right Shoulder :  Inspection/Palpation : no tenderness, no swelling or deformities  Range of Motion : full and painless in all planes, no crepitance  Strength : forward elevation 5/5, IR 5/5, ER 5/5, ER at 90 of abduction 5/5, supraspinatus 5/5, adduction 5/5, abduction 5/5, biceps/triceps 5/5,  5/5  Stability : no joint instability on provocative testing   Tests: Kelley negative, Neer negative, Devon negative, drop arm test negative, Summer Shade's test negative  Left Upper Extremity o Left Shoulder :  Inspection/Palpation : AC joint tenderness, no swelling or deformities  Range of Motion :mild discomfort of extremes of motion, cross chest maneuvers cause discomfort, no crepitance  Strength : forward elevation 5/5, IR 5/5, ER 5/5, ER at 90 of abduction 5/5, supraspinatus 5/5, adduction 5/5, abduction 5/5, biceps/triceps 5/5,  5/5  Stability : no joint instability on provocative testing   Tests: Kelley negative, Neer negative, Devon negative, drop arm test negative, Summer Shade's test negative  Gait and Station: Gait: gait normal, no significant extremity swelling or lymphedema, good proprioception and balance  Radiology Results 9/26/2024 o Left Shoulder : AP internal/external rotation and outlet views were obtained and revealed degenerative arthritis of the AC joint and mild degenerative arthritis of the glenohumeral joint no lytic lesions   o Shoulder injection : Indication- AC joint osteoarthritis, Anatomic location- left AC joint space, Spray - area was sterilized with Betadine and alcohol and anesthetized with Ethyl Chloride , needle used-25G, Medications given- 1cc's lidocaine, 0.5cc's kenalog, 0.5 cc's dexamethasone. The patient tolerated the procedure well.

## 2024-09-26 NOTE — ADDENDUM
[FreeTextEntry1] : I, NATAN FERNÁNDEZ, acted solely as a scribe for Dr. Ino Aviles on this date 09/26/2024.  All medical record entries made by the Scribe were at my, Dr. Ino Aviles, direction and personally dictated by me on 09/26/2024. I have reviewed the chart and agree that the record accurately reflects my personal performance of the history, physical exam, assessment and plan. I have also personally directed, reviewed, and agreed with the chart.

## 2024-09-26 NOTE — DISCUSSION/SUMMARY
[de-identified] : I went over the pathophysiology of the patient's symptoms in great detail with the patient. I discussed the underlying pathophysiology of the patient's condition in great detail with the patient. I went over the patient's x-rays with them in great detail. We discussed the use of ice, Tylenol and anti-inflammatories to relieve pain.  We discussed the use of Voltaren gel at this time. Instruction were discussed with the patient. She should avoid lifting anything overhead or past her shoulder level. The patient elected to receive a cortisone injection into her left AC joint today, and tolerated it well. I instructed the patient on ROM exercises, and told them to take it easy. The use of ice and rest was reviewed with the patient. The patient may resume activities tomorrow.   All of their questions were answered. They understand and consent to the plan.   FU in 4-6 weeks

## 2024-09-26 NOTE — HISTORY OF PRESENT ILLNESS
[de-identified] : 64 year old RHD female presents complaining of left shoulder pain. The patient cannot attribute her pain to any injury, fall, or trauma. She notes she has been lifting heavy things. She denies neck pain. Patient denies that she has any numbness or tingling. She notes she has been lifting heavy books. Patient denies taking any medication to manage her pain. She is interested in a left shoulder cortisone injection. She has difficulty sleeping on her left side.

## 2024-10-02 ENCOUNTER — APPOINTMENT (OUTPATIENT)
Dept: CARDIOLOGY | Facility: CLINIC | Age: 64
End: 2024-10-02

## 2024-10-03 ENCOUNTER — APPOINTMENT (OUTPATIENT)
Dept: CARDIOLOGY | Facility: CLINIC | Age: 64
End: 2024-10-03
Payer: COMMERCIAL

## 2024-10-03 VITALS
DIASTOLIC BLOOD PRESSURE: 50 MMHG | WEIGHT: 195 LBS | BODY MASS INDEX: 35.88 KG/M2 | SYSTOLIC BLOOD PRESSURE: 120 MMHG | HEART RATE: 88 BPM | OXYGEN SATURATION: 96 % | HEIGHT: 62 IN

## 2024-10-03 DIAGNOSIS — E66.9 OBESITY, UNSPECIFIED: ICD-10-CM

## 2024-10-03 PROCEDURE — G2211 COMPLEX E/M VISIT ADD ON: CPT | Mod: NC

## 2024-10-03 PROCEDURE — 99214 OFFICE O/P EST MOD 30 MIN: CPT

## 2024-10-03 RX ORDER — ROSUVASTATIN CALCIUM 5 MG/1
5 TABLET, FILM COATED ORAL
Qty: 90 | Refills: 3 | Status: ACTIVE | COMMUNITY
Start: 2024-10-03 | End: 1900-01-01

## 2024-10-03 NOTE — HISTORY OF PRESENT ILLNESS
[FreeTextEntry1] : 10/3/2024   She underwent colonoscopy  NO issues psoriasis.  Medciations: Omeprazole Calcium   7/2024 Echocardiogram showed normal EF and wall motion. CTA coronaries showed no obstructive CAD. No cardiac symptoms at present. She is cleared to proceed with upcoming colonoscopy LE venous duplex negative for DVT.  5/30  63 y/o F with PMHX OA, who presents for evaluation.  She has been feeling progressive shortness of breath with exertion.  She does have ankle edema bilaterally.   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  30 plus pounds  She has psoriatic arthritis and has L knee pains  Medications: Omeprazole 20mg daily  Calcium  No labs in a while.   BP is excellent Brother and father with history of PE  She follows with Dr. Neil vascular doc- for varicose veins.  Last duplex was 1 year ago.

## 2024-10-03 NOTE — PHYSICAL EXAM
[General Appearance - Well Developed] : well developed [Normal Appearance] : normal appearance [Well Groomed] : well groomed [General Appearance - Well Nourished] : well nourished [No Deformities] : no deformities [General Appearance - In No Acute Distress] : no acute distress [Normal Conjunctiva] : the conjunctiva exhibited no abnormalities [Eyelids - No Xanthelasma] : the eyelids demonstrated no xanthelasmas [Normal Oral Mucosa] : normal oral mucosa [No Oral Pallor] : no oral pallor [No Oral Cyanosis] : no oral cyanosis [Normal Jugular Venous A Waves Present] : normal jugular venous A waves present [Normal Jugular Venous V Waves Present] : normal jugular venous V waves present [No Jugular Venous Jose A Waves] : no jugular venous jose A waves [Respiration, Rhythm And Depth] : normal respiratory rhythm and effort [Exaggerated Use Of Accessory Muscles For Inspiration] : no accessory muscle use [Auscultation Breath Sounds / Voice Sounds] : lungs were clear to auscultation bilaterally [Heart Rate And Rhythm] : heart rate and rhythm were normal [Heart Sounds] : normal S1 and S2 [Murmurs] : no murmurs present [Abdomen Soft] : soft [Abdomen Tenderness] : non-tender [] : no hepato-splenomegaly [Abdomen Mass (___ Cm)] : no abdominal mass palpated [FreeTextEntry1] : LE edema.

## 2024-10-03 NOTE — ASSESSMENT
[FreeTextEntry1] : Assessment: 1.  Dyspnea on exertion 2.  strong family history of VTE 3.  Obesity   Plan: 1.  Echo was ok  2.  Coronary CTA with atherosclerosis ,  3.  Venous duplex was ok  4.  weight loss!! 5.   Given info for yaakov garibay  6.   Start statin - crestor 5mg daily  and COQ 10.   7.   See PCP in 3 months to check lab work  8.  RTC in 1 year.  9.  To see pulmary doc, to rai for lung nodule see on coronary CTA.

## 2024-10-15 ENCOUNTER — APPOINTMENT (OUTPATIENT)
Dept: ORTHOPEDIC SURGERY | Facility: CLINIC | Age: 64
End: 2024-10-15
Payer: OTHER MISCELLANEOUS

## 2024-10-15 VITALS — WEIGHT: 195 LBS | HEIGHT: 62 IN | BODY MASS INDEX: 35.88 KG/M2

## 2024-10-15 DIAGNOSIS — M17.12 UNILATERAL PRIMARY OSTEOARTHRITIS, LEFT KNEE: ICD-10-CM

## 2024-10-15 DIAGNOSIS — E66.9 OBESITY, UNSPECIFIED: ICD-10-CM

## 2024-10-15 PROCEDURE — 99214 OFFICE O/P EST MOD 30 MIN: CPT

## 2024-10-22 ENCOUNTER — APPOINTMENT (OUTPATIENT)
Dept: ORTHOPEDIC SURGERY | Facility: CLINIC | Age: 64
End: 2024-10-22

## 2024-11-12 ENCOUNTER — APPOINTMENT (OUTPATIENT)
Dept: ORTHOPEDIC SURGERY | Facility: CLINIC | Age: 64
End: 2024-11-12
Payer: COMMERCIAL

## 2024-11-12 DIAGNOSIS — E66.9 OBESITY, UNSPECIFIED: ICD-10-CM

## 2024-11-12 DIAGNOSIS — M17.11 UNILATERAL PRIMARY OSTEOARTHRITIS, RIGHT KNEE: ICD-10-CM

## 2024-11-12 PROCEDURE — 99213 OFFICE O/P EST LOW 20 MIN: CPT

## 2024-11-14 ENCOUNTER — APPOINTMENT (OUTPATIENT)
Dept: ORTHOPEDIC SURGERY | Facility: CLINIC | Age: 64
End: 2024-11-14
Payer: COMMERCIAL

## 2024-11-14 DIAGNOSIS — M19.019 PRIMARY OSTEOARTHRITIS, UNSPECIFIED SHOULDER: ICD-10-CM

## 2024-11-14 PROCEDURE — 99213 OFFICE O/P EST LOW 20 MIN: CPT

## 2024-12-10 ENCOUNTER — APPOINTMENT (OUTPATIENT)
Dept: ORTHOPEDIC SURGERY | Facility: CLINIC | Age: 64
End: 2024-12-10
Payer: OTHER MISCELLANEOUS

## 2024-12-10 DIAGNOSIS — M17.12 UNILATERAL PRIMARY OSTEOARTHRITIS, LEFT KNEE: ICD-10-CM

## 2024-12-10 PROCEDURE — 99214 OFFICE O/P EST MOD 30 MIN: CPT

## 2025-01-02 ENCOUNTER — APPOINTMENT (OUTPATIENT)
Dept: ORTHOPEDIC SURGERY | Facility: CLINIC | Age: 65
End: 2025-01-02
Payer: COMMERCIAL

## 2025-01-02 DIAGNOSIS — M17.11 UNILATERAL PRIMARY OSTEOARTHRITIS, RIGHT KNEE: ICD-10-CM

## 2025-01-02 PROCEDURE — 99214 OFFICE O/P EST MOD 30 MIN: CPT | Mod: 25

## 2025-01-02 PROCEDURE — 20610 DRAIN/INJ JOINT/BURSA W/O US: CPT | Mod: RT

## 2025-01-02 RX ORDER — HYALURONATE SODIUM 20 MG/2 ML
20 SYRINGE (ML) INTRAARTICULAR WEEKLY
Qty: 1 | Refills: 0 | Status: ACTIVE | COMMUNITY
Start: 2025-01-02

## 2025-01-21 ENCOUNTER — APPOINTMENT (OUTPATIENT)
Dept: ORTHOPEDIC SURGERY | Facility: CLINIC | Age: 65
End: 2025-01-21
Payer: OTHER MISCELLANEOUS

## 2025-01-21 DIAGNOSIS — S80.02XA CONTUSION OF LEFT KNEE, INITIAL ENCOUNTER: ICD-10-CM

## 2025-01-21 DIAGNOSIS — M17.12 UNILATERAL PRIMARY OSTEOARTHRITIS, LEFT KNEE: ICD-10-CM

## 2025-01-21 PROCEDURE — 73564 X-RAY EXAM KNEE 4 OR MORE: CPT | Mod: LT

## 2025-01-21 PROCEDURE — 99214 OFFICE O/P EST MOD 30 MIN: CPT

## 2025-01-21 RX ORDER — HYALURONATE SODIUM, STABILIZED 88 MG/4 ML
88 SYRINGE (ML) INTRAARTICULAR
Qty: 1 | Refills: 0 | Status: ACTIVE | COMMUNITY
Start: 2025-01-21

## 2025-02-12 ENCOUNTER — APPOINTMENT (OUTPATIENT)
Dept: ORTHOPEDIC SURGERY | Facility: CLINIC | Age: 65
End: 2025-02-12
Payer: COMMERCIAL

## 2025-02-12 DIAGNOSIS — M17.11 UNILATERAL PRIMARY OSTEOARTHRITIS, RIGHT KNEE: ICD-10-CM

## 2025-02-12 PROCEDURE — 99213 OFFICE O/P EST LOW 20 MIN: CPT

## 2025-02-12 RX ORDER — HYALURONATE SODIUM, STABILIZED 88 MG/4 ML
88 SYRINGE (ML) INTRAARTICULAR
Qty: 1 | Refills: 0 | Status: ACTIVE | COMMUNITY
Start: 2025-02-12

## 2025-02-24 ENCOUNTER — APPOINTMENT (OUTPATIENT)
Dept: ORTHOPEDIC SURGERY | Facility: CLINIC | Age: 65
End: 2025-02-24
Payer: COMMERCIAL

## 2025-02-24 DIAGNOSIS — M19.019 PRIMARY OSTEOARTHRITIS, UNSPECIFIED SHOULDER: ICD-10-CM

## 2025-02-24 DIAGNOSIS — M25.812 OTHER SPECIFIED JOINT DISORDERS, LEFT SHOULDER: ICD-10-CM

## 2025-02-24 PROCEDURE — 20605 DRAIN/INJ JOINT/BURSA W/O US: CPT | Mod: LT

## 2025-02-24 PROCEDURE — 99213 OFFICE O/P EST LOW 20 MIN: CPT | Mod: 25

## 2025-03-10 ENCOUNTER — APPOINTMENT (OUTPATIENT)
Dept: ORTHOPEDIC SURGERY | Facility: CLINIC | Age: 65
End: 2025-03-10
Payer: COMMERCIAL

## 2025-03-10 DIAGNOSIS — M17.12 UNILATERAL PRIMARY OSTEOARTHRITIS, LEFT KNEE: ICD-10-CM

## 2025-03-10 DIAGNOSIS — M17.11 UNILATERAL PRIMARY OSTEOARTHRITIS, RIGHT KNEE: ICD-10-CM

## 2025-03-10 PROCEDURE — 20611 DRAIN/INJ JOINT/BURSA W/US: CPT | Mod: RT

## 2025-03-10 PROCEDURE — 99213 OFFICE O/P EST LOW 20 MIN: CPT | Mod: 25

## 2025-03-24 ENCOUNTER — APPOINTMENT (OUTPATIENT)
Dept: ORTHOPEDIC SURGERY | Facility: CLINIC | Age: 65
End: 2025-03-24
Payer: COMMERCIAL

## 2025-03-24 DIAGNOSIS — M19.019 PRIMARY OSTEOARTHRITIS, UNSPECIFIED SHOULDER: ICD-10-CM

## 2025-03-24 PROCEDURE — 99213 OFFICE O/P EST LOW 20 MIN: CPT

## 2025-03-31 ENCOUNTER — APPOINTMENT (OUTPATIENT)
Dept: ORTHOPEDIC SURGERY | Facility: CLINIC | Age: 65
End: 2025-03-31
Payer: OTHER MISCELLANEOUS

## 2025-03-31 DIAGNOSIS — M17.12 UNILATERAL PRIMARY OSTEOARTHRITIS, LEFT KNEE: ICD-10-CM

## 2025-03-31 PROCEDURE — 20611 DRAIN/INJ JOINT/BURSA W/US: CPT | Mod: LT

## 2025-05-13 ENCOUNTER — APPOINTMENT (OUTPATIENT)
Dept: ORTHOPEDIC SURGERY | Facility: CLINIC | Age: 65
End: 2025-05-13
Payer: OTHER MISCELLANEOUS

## 2025-05-13 DIAGNOSIS — M17.12 UNILATERAL PRIMARY OSTEOARTHRITIS, LEFT KNEE: ICD-10-CM

## 2025-05-13 DIAGNOSIS — S76.312D STRAIN OF MUSCLE, FASCIA AND TENDON OF THE POSTERIOR MUSCLE GROUP AT THIGH LEVEL, LEFT THIGH, SUBSEQUENT ENCOUNTER: ICD-10-CM

## 2025-05-13 PROCEDURE — 99214 OFFICE O/P EST MOD 30 MIN: CPT

## 2025-07-01 ENCOUNTER — APPOINTMENT (OUTPATIENT)
Dept: ORTHOPEDIC SURGERY | Facility: CLINIC | Age: 65
End: 2025-07-01
Payer: OTHER MISCELLANEOUS

## 2025-07-01 VITALS — WEIGHT: 195 LBS | HEIGHT: 62 IN | BODY MASS INDEX: 35.88 KG/M2

## 2025-07-01 PROCEDURE — 99214 OFFICE O/P EST MOD 30 MIN: CPT

## 2025-07-02 RX ORDER — HYALURONATE SODIUM, STABILIZED 88 MG/4 ML
88 SYRINGE (ML) INTRAARTICULAR
Qty: 1 | Refills: 0 | Status: ACTIVE | COMMUNITY
Start: 2025-07-02

## 2025-07-22 ENCOUNTER — APPOINTMENT (OUTPATIENT)
Dept: ORTHOPEDIC SURGERY | Facility: CLINIC | Age: 65
End: 2025-07-22
Payer: COMMERCIAL

## 2025-07-22 VITALS — WEIGHT: 190 LBS | BODY MASS INDEX: 34.96 KG/M2 | HEIGHT: 62 IN

## 2025-07-22 DIAGNOSIS — M75.02 ADHESIVE CAPSULITIS OF LEFT SHOULDER: ICD-10-CM

## 2025-07-22 DIAGNOSIS — M47.812 SPONDYLOSIS W/OUT MYELOPATHY OR RADICULOPATHY, CERVICAL REGION: ICD-10-CM

## 2025-07-22 DIAGNOSIS — M19.019 PRIMARY OSTEOARTHRITIS, UNSPECIFIED SHOULDER: ICD-10-CM

## 2025-07-22 PROCEDURE — 73030 X-RAY EXAM OF SHOULDER: CPT | Mod: LT

## 2025-07-22 PROCEDURE — 99214 OFFICE O/P EST MOD 30 MIN: CPT

## 2025-07-22 PROCEDURE — 72040 X-RAY EXAM NECK SPINE 2-3 VW: CPT

## 2025-07-28 ENCOUNTER — APPOINTMENT (OUTPATIENT)
Dept: ORTHOPEDIC SURGERY | Facility: CLINIC | Age: 65
End: 2025-07-28
Payer: COMMERCIAL

## 2025-07-28 DIAGNOSIS — G56.01 CARPAL TUNNEL SYNDROME, RIGHT UPPER LIMB: ICD-10-CM

## 2025-07-28 DIAGNOSIS — M65.331 TRIGGER FINGER, RIGHT MIDDLE FINGER: ICD-10-CM

## 2025-07-28 PROCEDURE — 20550 NJX 1 TENDON SHEATH/LIGAMENT: CPT | Mod: F7

## 2025-07-28 PROCEDURE — 99204 OFFICE O/P NEW MOD 45 MIN: CPT | Mod: 25

## 2025-08-19 ENCOUNTER — APPOINTMENT (OUTPATIENT)
Dept: ORTHOPEDIC SURGERY | Facility: CLINIC | Age: 65
End: 2025-08-19

## 2025-09-02 ENCOUNTER — APPOINTMENT (OUTPATIENT)
Dept: ORTHOPEDIC SURGERY | Facility: CLINIC | Age: 65
End: 2025-09-02